# Patient Record
Sex: FEMALE | Race: WHITE | Employment: OTHER | URBAN - METROPOLITAN AREA
[De-identification: names, ages, dates, MRNs, and addresses within clinical notes are randomized per-mention and may not be internally consistent; named-entity substitution may affect disease eponyms.]

---

## 2017-02-20 ENCOUNTER — ONCOLOGY VISIT (OUTPATIENT)
Dept: ONCOLOGY | Facility: CLINIC | Age: 67
End: 2017-02-20
Attending: INTERNAL MEDICINE
Payer: MEDICARE

## 2017-02-20 ENCOUNTER — HOSPITAL ENCOUNTER (OUTPATIENT)
Facility: CLINIC | Age: 67
Setting detail: SPECIMEN
Discharge: HOME | End: 2017-02-20
Attending: INTERNAL MEDICINE | Admitting: INTERNAL MEDICINE
Payer: MEDICARE

## 2017-02-20 DIAGNOSIS — D50.9 IRON DEFICIENCY ANEMIA, UNSPECIFIED IRON DEFICIENCY ANEMIA TYPE: ICD-10-CM

## 2017-02-20 LAB
ALBUMIN SERPL-MCNC: 3.6 G/DL (ref 3.4–5)
ALP SERPL-CCNC: 108 U/L (ref 40–150)
ALT SERPL W P-5'-P-CCNC: 34 U/L (ref 0–50)
ANION GAP SERPL CALCULATED.3IONS-SCNC: 7 MMOL/L (ref 3–14)
AST SERPL W P-5'-P-CCNC: 33 U/L (ref 0–45)
BASOPHILS # BLD AUTO: 0 10E9/L (ref 0–0.2)
BASOPHILS NFR BLD AUTO: 0.1 %
BILIRUB SERPL-MCNC: 0.4 MG/DL (ref 0.2–1.3)
BUN SERPL-MCNC: 59 MG/DL (ref 7–30)
CALCIUM SERPL-MCNC: 9.6 MG/DL (ref 8.5–10.1)
CHLORIDE SERPL-SCNC: 108 MMOL/L (ref 94–109)
CO2 SERPL-SCNC: 21 MMOL/L (ref 20–32)
CREAT SERPL-MCNC: 1.46 MG/DL (ref 0.52–1.04)
DIFFERENTIAL METHOD BLD: ABNORMAL
EOSINOPHIL # BLD AUTO: 0.2 10E9/L (ref 0–0.7)
EOSINOPHIL NFR BLD AUTO: 2.7 %
ERYTHROCYTE [DISTWIDTH] IN BLOOD BY AUTOMATED COUNT: 13.3 % (ref 10–15)
FERRITIN SERPL-MCNC: 258 NG/ML (ref 8–252)
GFR SERPL CREATININE-BSD FRML MDRD: 36 ML/MIN/1.7M2
GLUCOSE SERPL-MCNC: 91 MG/DL (ref 70–99)
HCT VFR BLD AUTO: 34 % (ref 35–47)
HGB BLD-MCNC: 11.5 G/DL (ref 11.7–15.7)
IMM GRANULOCYTES # BLD: 0 10E9/L (ref 0–0.4)
IMM GRANULOCYTES NFR BLD: 0.3 %
IRON SATN MFR SERPL: 20 % (ref 15–46)
IRON SERPL-MCNC: 50 UG/DL (ref 35–180)
LYMPHOCYTES # BLD AUTO: 1.1 10E9/L (ref 0.8–5.3)
LYMPHOCYTES NFR BLD AUTO: 14 %
MCH RBC QN AUTO: 30.1 PG (ref 26.5–33)
MCHC RBC AUTO-ENTMCNC: 33.8 G/DL (ref 31.5–36.5)
MCV RBC AUTO: 89 FL (ref 78–100)
MONOCYTES # BLD AUTO: 0.9 10E9/L (ref 0–1.3)
MONOCYTES NFR BLD AUTO: 11.3 %
NEUTROPHILS # BLD AUTO: 5.6 10E9/L (ref 1.6–8.3)
NEUTROPHILS NFR BLD AUTO: 71.6 %
NRBC # BLD AUTO: 0 10*3/UL
NRBC BLD AUTO-RTO: 0 /100
PLATELET # BLD AUTO: 222 10E9/L (ref 150–450)
POTASSIUM SERPL-SCNC: 5.1 MMOL/L (ref 3.4–5.3)
PROT SERPL-MCNC: 8.2 G/DL (ref 6.8–8.8)
RBC # BLD AUTO: 3.82 10E12/L (ref 3.8–5.2)
SODIUM SERPL-SCNC: 136 MMOL/L (ref 133–144)
TIBC SERPL-MCNC: 246 UG/DL (ref 240–430)
TRANSFERRIN SERPL-MCNC: 190 MG/DL (ref 210–360)
WBC # BLD AUTO: 7.8 10E9/L (ref 4–11)

## 2017-02-20 PROCEDURE — 40000269 ZZH STATISTIC NO CHARGE FACILITY FEE

## 2017-02-20 PROCEDURE — 83540 ASSAY OF IRON: CPT | Performed by: INTERNAL MEDICINE

## 2017-02-20 PROCEDURE — 84466 ASSAY OF TRANSFERRIN: CPT | Performed by: INTERNAL MEDICINE

## 2017-02-20 PROCEDURE — 80053 COMPREHEN METABOLIC PANEL: CPT | Performed by: INTERNAL MEDICINE

## 2017-02-20 PROCEDURE — 82728 ASSAY OF FERRITIN: CPT | Performed by: INTERNAL MEDICINE

## 2017-02-20 PROCEDURE — 83550 IRON BINDING TEST: CPT | Performed by: INTERNAL MEDICINE

## 2017-02-20 PROCEDURE — 85025 COMPLETE CBC W/AUTO DIFF WBC: CPT | Performed by: INTERNAL MEDICINE

## 2017-02-20 PROCEDURE — 36415 COLL VENOUS BLD VENIPUNCTURE: CPT

## 2017-02-20 NOTE — MR AVS SNAPSHOT
After Visit Summary   2/20/2017    Abbey Aguiar    MRN: [id number]           Patient Information     Date Of Birth          1950        Visit Information        Provider Department      2/20/2017 11:00 AM Nurse, [facility]        Today's Diagnoses     Iron deficiency anemia, unspecified iron deficiency anemia type           Follow-ups after your visit        Who to contact     If you have questions or need follow up information about today's clinic visit or your schedule please contact [facility] directly at [phone].  Normal or non-critical lab and imaging results will be communicated to you by ZAOZAOhart, letter or phone within 4 business days after the clinic has received the results. If you do not hear from us within 7 days, please contact the clinic through Minubo or phone. If you have a critical or abnormal lab result, we will notify you by phone as soon as possible.  Submit refill requests through Minubo or call your pharmacy and they will forward the refill request to us. Please allow 3 business days for your refill to be completed.          Additional Information About Your Visit        MyChart Information     Minubo gives you secure access to your electronic health record. If you see a primary care provider, you can also send messages to your care team and make appointments. If you have questions, please call your primary care clinic.  If you do not have a primary care provider, please call [phone] and they will assist you.        Care EveryWhere ID     This is your Care EveryWhere ID. This could be used by other organizations to access your [facility] medical records  [id number]        Your Vitals Were     Last Period                   05/07/1986            Blood Pressure from Last 3 Encounters:   02/22/17 121/76   08/24/16 147/75   04/20/16 142/72    Weight from Last 3 Encounters:   02/22/17 54.8 kg (120 lb 12.8 oz)   08/24/16 52.6 kg  (116 lb)   04/20/16 51.4 kg (113 lb 6.4 oz)              We Performed the Following     CBC with platelets differential     Comprehensive metabolic panel     Ferritin     Iron and iron binding capacity     Transferrin        Primary Care Provider Office Phone # Fax #    Marian Stovall [phone]       [facility] [address]  [place] [address]        Thank you!     Thank you for choosing [facility]  for your care. Our goal is always to provide you with excellent care. Hearing back from our patients is one way we can continue to improve our services. Please take a few minutes to complete the written survey that you may receive in the mail after your visit with us. Thank you!             Your Updated Medication List - Protect others around you: Learn how to safely use, store and throw away your medicines at www.disposemymeds.org.          This list is accurate as of: 2/20/17 11:59 PM.  Always use your most recent med list.                   Brand Name Dispense Instructions for use    amitriptyline 25 MG tablet    ELAVIL     1 TABLET QHS       amoxicillin 500 MG capsule    AMOXIL     Take 500 mg by mouth See Admin Instructions. Take 4 tablets 1 hour prior to dental work       aspirin 81 MG tablet      1 TABLET DAILY       BONIVA 3 MG/3ML   Generic drug:  ibandronate      3 MG INTRAVENOUSLY EVERY 3 MONTHS       desonide 0.05 % cream    DESOWEN     Apply  topically 2 times daily.       DIOVAN PO      Take 80 mg by mouth 2 times daily       ENBREL 50 MG/ML injection   Generic drug:  etanercept      50 MG EVERY WEEK       Ferrous Sulfate 324 (65 FE) MG Tbec      Take 2 tablets by mouth daily.       Fish Oil 1200 MG Caps      2 tabs daily       LORazepam 0.5 MG tablet    ATIVAN     1 tablet PRN       metoprolol 50 MG 24 hr tablet    TOPROL-XL     Take 50 mg by mouth every morning.       NITROSTAT 0.4 MG sublingual tablet   Generic drug:  nitroglycerin      Place 0.4 mg under  the tongue every 5 minutes as needed.       TYLENOL 500 MG tablet   Generic drug:  acetaminophen      Take 1-2 tablets by mouth every 6 hours as needed.       vitamin D 1000 UNITS capsule      Take 1 capsule by mouth daily

## 2017-02-22 ENCOUNTER — ONCOLOGY VISIT (OUTPATIENT)
Dept: ONCOLOGY | Facility: CLINIC | Age: 67
End: 2017-02-22
Attending: INTERNAL MEDICINE
Payer: COMMERCIAL

## 2017-02-22 VITALS
HEART RATE: 71 BPM | RESPIRATION RATE: 16 BRPM | WEIGHT: 120.8 LBS | BODY MASS INDEX: 22.46 KG/M2 | TEMPERATURE: 98.4 F | SYSTOLIC BLOOD PRESSURE: 121 MMHG | OXYGEN SATURATION: 97 % | DIASTOLIC BLOOD PRESSURE: 76 MMHG

## 2017-02-22 DIAGNOSIS — D50.9 ANEMIA, IRON DEFICIENCY: Primary | ICD-10-CM

## 2017-02-22 PROCEDURE — 99213 OFFICE O/P EST LOW 20 MIN: CPT | Performed by: INTERNAL MEDICINE

## 2017-02-22 PROCEDURE — 99211 OFF/OP EST MAY X REQ PHY/QHP: CPT

## 2017-02-22 ASSESSMENT — PAIN SCALES - GENERAL: PAINLEVEL: MODERATE PAIN (4)

## 2017-02-22 NOTE — PROGRESS NOTES
[facility] PHYSICIANS    HEMATOLOGY ONCOLOGY  FOLLOW-UP VISIT NOTE    PATIENT NAME: Meli Aguiar MRN # [id number]    Reason for visit: Anemia of chronic disease   Iron deficiency anemia since last more than 20 years.  Chronic renal failure, Ankylosing spondylitis and chronic rheumatological disease with overlap syndrome, HLA-B27 positivity.   Crohn's disease last colonoscopy 3 years ago- due for one 02/2015. PUD, EGD 10-15 years ago.     SUBJECTIVE   Patient comes in for a follow up today. She has been feeling well. Coming off from steroids.    REVIEW OF SYSTEMS   As above in the HPI, o/w a complete ROS was negative.    Past medical, social histories reviewed.    Meds- Reviewed.   PHYSICAL EXAM   /76 (BP Location: Right arm, Patient Position: Chair, Cuff Size: Adult Regular)  Pulse 71  Temp 98.4  F (36.9  C) (Oral)  Resp 16  Wt 54.8 kg (120 lb 12.8 oz)  LMP 05/07/1986  SpO2 97%  BMI 22.46 kg/m2  GEN: NAD  NEURO: alert    DATA:  Recent Labs   Lab Test  02/20/17   1040  08/22/16   1220   NA  136  135   POTASSIUM  5.1  5.1   CHLORIDE  108  108   CO2  21  20   ANIONGAP  7  7   BUN  59*  37*   CR  1.46*  1.29*   GLC  91  85   ANABEL  9.6  9.8     Recent Labs   Lab Test  02/20/17   1040  08/22/16   1220  04/20/16   1043   WBC  7.8  12.2*  11.0   HGB  11.5*  12.3  11.2*   PLT  222  250  240   MCV  89  90  88   NEUTROPHIL  71.6  77.8  77.5     Recent Labs   Lab Test  02/20/17   1040  08/22/16   1220  04/20/16   1043   09/10/14   1005   03/13/14   0927  12/16/13   1045   BILITOTAL  0.4  0.3  0.2   < >  0.3   < >  0.4  0.4   ALKPHOS  108  119  97   < >  88   < >  82  74   ALT  34  46  44   < >  32   < >  34  36   AST  33  31  25   < >  31   < >  35  35   ALBUMIN  3.6  3.6  3.5   < >  3.5   < >  4.5  4.8   LDH   --    --    --    --   110   --   230*  277*    < > = values in this interval not displayed.     Results for ASHER MELI CODY (MRN [id number]) as of 2/22/2017 10:25   Ref. Range 8/22/2016 12:20  2/20/2017 10:40   Ferritin Latest Ref Range: 8 - 252 ng/mL 159 258 (H)   Iron Latest Ref Range: 35 - 180 ug/dL 52 50   Iron Binding Cap Latest Ref Range: 240 - 430 ug/dL 254 246   Iron Saturation Index Latest Ref Range: 15 - 46 % 20 20   Transferrin Latest Ref Range: 210 - 360 mg/dL 189 (L) 190 (L)     ECOG PS: 1     ASSESSMENT   63-year-old woman with a history of anemia of chronic disease and iron deficiency anemia in addition to chronic rheumatological illness. She had intravenous iron infusion and she is not able to tolerate supplemental oral iron due to her multiple medical problems and difficulty with taking supplemental oral iron.  This results abdominal discomfort and intolerance.   - Labs were reviewed with the patient- fairly stable.  - She has needed IV iron replacement for a long time. I think she can continue oral iron for now and follow up with primary care physician with periodic blood counts and iron studies. I will be happy to see her again is anemia or iron deficiency becomes an issue in future.   PLAN   1- Follow up with Dr. Wilman Thakur MD  2/22/2017     cc: Marshall Stovall

## 2017-02-22 NOTE — NURSING NOTE
DISCHARGE PLAN:  Next appointments: See patient instruction section  Departure Mode: Ambulatory  Accompanied by: Self  5 minutes for nursing discharge (face to face time)   Radha Holly RN    1- Follow up with Dr. Stovall--Lizette RAMOS.--Pt verbalized understanding

## 2017-02-22 NOTE — PROGRESS NOTES
"Abbey Aguiar is a 66 year old female who presents for:  Chief Complaint   Patient presents with     Oncology Clinic Visit     Ret anemia        Initial Vitals:  /76 (BP Location: Right arm, Patient Position: Chair, Cuff Size: Adult Regular)  Pulse 71  Temp 98.4  F (36.9  C) (Oral)  Resp 16  Wt 54.8 kg (120 lb 12.8 oz)  LMP 05/07/1986  SpO2 97%  BMI 22.46 kg/m2 Estimated body mass index is 22.46 kg/(m^2) as calculated from the following:    Height as of 2/12/13: 1.562 m (5' 1.5\").    Weight as of this encounter: 54.8 kg (120 lb 12.8 oz).. Body surface area is 1.54 meters squared. BP completed using cuff size: regular  Moderate Pain (4) Patient's last menstrual period was 05/07/1986. Allergies and medications reviewed.     Medications: Medication refills not needed today.  Pharmacy name entered into [facility]:    [facility] [id number] [place] [facility] [place] [facility] [address] [facility] [place] [address]  [id number] [place], [place] - [address] [facility] AT [facility] [address]    Comments: None    6 minutes for nursing intake (face to face time)   Divya Gonzalez CMA          "

## 2017-02-22 NOTE — MR AVS SNAPSHOT
After Visit Summary   2/22/2017    Abbey Aguiar    MRN: [id number]           Patient Information     Date Of Birth          1950        Visit Information        Provider Department      2/22/2017 11:00 AM Edilson Thakur MD [facility]        Care Instructions    1- Follow up with Dr. Stovall--Lizette PCP.        Follow-ups after your visit        Who to contact     If you have questions or need follow up information about today's clinic visit or your schedule please contact [facility] directly at [phone].  Normal or non-critical lab and imaging results will be communicated to you by Benaissancehart, letter or phone within 4 business days after the clinic has received the results. If you do not hear from us within 7 days, please contact the clinic through Wave Systemst or phone. If you have a critical or abnormal lab result, we will notify you by phone as soon as possible.  Submit refill requests through Wonolo or call your pharmacy and they will forward the refill request to us. Please allow 3 business days for your refill to be completed.          Additional Information About Your Visit        MyChart Information     Wonolo gives you secure access to your electronic health record. If you see a primary care provider, you can also send messages to your care team and make appointments. If you have questions, please call your primary care clinic.  If you do not have a primary care provider, please call [phone] and they will assist you.        Care EveryWhere ID     This is your Care EveryWhere ID. This could be used by other organizations to access your [place] medical records  [id number]        Your Vitals Were     Pulse Temperature Respirations Last Period Pulse Oximetry BMI (Body Mass Index)    71 98.4  F (36.9  C) (Oral) 16 05/07/1986 97% 22.46 kg/m2       Blood Pressure from Last 3 Encounters:   02/22/17 121/76   08/24/16 147/75   04/20/16 142/72    Weight from Last 3  Encounters:   02/22/17 54.8 kg (120 lb 12.8 oz)   08/24/16 52.6 kg (116 lb)   04/20/16 51.4 kg (113 lb 6.4 oz)              Today, you had the following     No orders found for display       Primary Care Provider Office Phone # Fax #    Marian Stovall [phone]       [place] [address]  [place] [address]        Thank you!     Thank you for choosing [facility]  for your care. Our goal is always to provide you with excellent care. Hearing back from our patients is one way we can continue to improve our services. Please take a few minutes to complete the written survey that you may receive in the mail after your visit with us. Thank you!             Your Updated Medication List - Protect others around you: Learn how to safely use, store and throw away your medicines at www.disposemymeds.org.          This list is accurate as of: 2/22/17 11:06 AM.  Always use your most recent med list.                   Brand Name Dispense Instructions for use    amitriptyline 25 MG tablet    ELAVIL     1 TABLET QHS       amoxicillin 500 MG capsule    AMOXIL     Take 500 mg by mouth See Admin Instructions. Take 4 tablets 1 hour prior to dental work       aspirin 81 MG tablet      1 TABLET DAILY       BONIVA 3 MG/3ML   Generic drug:  ibandronate      3 MG INTRAVENOUSLY EVERY 3 MONTHS       desonide 0.05 % cream    DESOWEN     Apply  topically 2 times daily.       DIOVAN PO      Take 80 mg by mouth 2 times daily       ENBREL 50 MG/ML injection   Generic drug:  etanercept      50 MG EVERY WEEK       Ferrous Sulfate 324 (65 FE) MG Tbec      Take 2 tablets by mouth daily.       Fish Oil 1200 MG Caps      2 tabs daily       LORazepam 0.5 MG tablet    ATIVAN     1 tablet PRN       metoprolol 50 MG 24 hr tablet    TOPROL-XL     Take 50 mg by mouth every morning.       NITROSTAT 0.4 MG sublingual tablet   Generic drug:  nitroglycerin      Place 0.4 mg under the tongue every 5 minutes as  needed.       ROSUVASTATIN CALCIUM PO      Take 5 mg by mouth every other day       TYLENOL 500 MG tablet   Generic drug:  acetaminophen      Take 1-2 tablets by mouth every 6 hours as needed.       vitamin D 1000 UNITS capsule      Take 1 capsule by mouth daily

## 2017-03-15 ENCOUNTER — TRANSFERRED RECORDS (OUTPATIENT)
Dept: HEALTH INFORMATION MANAGEMENT | Facility: CLINIC | Age: 67
End: 2017-03-15

## 2017-04-17 NOTE — PROGRESS NOTES
Patient here for labs  Labs drawn:see orders      Denies concerns or issues that need to be addressed prior to appt with MD Divya Gonzalez

## 2017-08-16 ENCOUNTER — TRANSFERRED RECORDS (OUTPATIENT)
Dept: HEALTH INFORMATION MANAGEMENT | Facility: CLINIC | Age: 67
End: 2017-08-16

## 2018-02-14 ENCOUNTER — TRANSFERRED RECORDS (OUTPATIENT)
Dept: HEALTH INFORMATION MANAGEMENT | Facility: CLINIC | Age: 68
End: 2018-02-14

## 2018-02-14 LAB
ALT SERPL-CCNC: 69 IU/L (ref 5–35)
AST SERPL-CCNC: 56 U/L (ref 5–34)
CREAT SERPL-MCNC: 1.39 MG/DL (ref 0.5–1.3)
GFR SERPL CREATININE-BSD FRML MDRD: 40.1 ML/MIN/1.73M2

## 2018-05-11 ENCOUNTER — TRANSFERRED RECORDS (OUTPATIENT)
Dept: HEALTH INFORMATION MANAGEMENT | Facility: CLINIC | Age: 68
End: 2018-05-11

## 2018-08-15 ENCOUNTER — TRANSFERRED RECORDS (OUTPATIENT)
Dept: HEALTH INFORMATION MANAGEMENT | Facility: CLINIC | Age: 68
End: 2018-08-15

## 2018-12-05 ENCOUNTER — TRANSFERRED RECORDS (OUTPATIENT)
Dept: HEALTH INFORMATION MANAGEMENT | Facility: CLINIC | Age: 68
End: 2018-12-05

## 2019-02-15 ENCOUNTER — TRANSFERRED RECORDS (OUTPATIENT)
Dept: HEALTH INFORMATION MANAGEMENT | Facility: CLINIC | Age: 69
End: 2019-02-15

## 2019-07-11 ENCOUNTER — TRANSFERRED RECORDS (OUTPATIENT)
Dept: HEALTH INFORMATION MANAGEMENT | Facility: CLINIC | Age: 69
End: 2019-07-11

## 2019-08-26 ENCOUNTER — TRANSFERRED RECORDS (OUTPATIENT)
Dept: HEALTH INFORMATION MANAGEMENT | Facility: CLINIC | Age: 69
End: 2019-08-26

## 2019-10-02 ENCOUNTER — HEALTH MAINTENANCE LETTER (OUTPATIENT)
Age: 69
End: 2019-10-02

## 2019-11-13 ENCOUNTER — TRANSFERRED RECORDS (OUTPATIENT)
Dept: HEALTH INFORMATION MANAGEMENT | Facility: CLINIC | Age: 69
End: 2019-11-13

## 2019-12-16 ENCOUNTER — HEALTH MAINTENANCE LETTER (OUTPATIENT)
Age: 69
End: 2019-12-16

## 2020-05-26 DIAGNOSIS — Z11.59 ENCOUNTER FOR SCREENING FOR OTHER VIRAL DISEASES: Primary | ICD-10-CM

## 2020-06-06 DIAGNOSIS — Z11.59 ENCOUNTER FOR SCREENING FOR OTHER VIRAL DISEASES: ICD-10-CM

## 2020-06-06 PROCEDURE — 99207 ZZC NO BILLABLE SERVICE THIS VISIT: CPT

## 2020-06-08 LAB
SARS-COV-2 PCR COMMENT: NORMAL
SARS-COV-2 RNA SPEC QL NAA+PROBE: NEGATIVE
SARS-COV-2 RNA SPEC QL NAA+PROBE: NORMAL
SPECIMEN SOURCE: NORMAL
SPECIMEN SOURCE: NORMAL

## 2020-06-09 ENCOUNTER — ANESTHESIA EVENT (OUTPATIENT)
Dept: GASTROENTEROLOGY | Facility: CLINIC | Age: 70
End: 2020-06-09
Payer: COMMERCIAL

## 2020-06-09 ENCOUNTER — HOSPITAL ENCOUNTER (OUTPATIENT)
Facility: CLINIC | Age: 70
Discharge: HOME | End: 2020-06-09
Attending: INTERNAL MEDICINE | Admitting: INTERNAL MEDICINE
Payer: COMMERCIAL

## 2020-06-09 ENCOUNTER — ANESTHESIA (OUTPATIENT)
Dept: GASTROENTEROLOGY | Facility: CLINIC | Age: 70
End: 2020-06-09
Payer: COMMERCIAL

## 2020-06-09 VITALS
HEART RATE: 68 BPM | OXYGEN SATURATION: 96 % | HEIGHT: 59 IN | SYSTOLIC BLOOD PRESSURE: 147 MMHG | BODY MASS INDEX: 23.1 KG/M2 | DIASTOLIC BLOOD PRESSURE: 86 MMHG | WEIGHT: 114.6 LBS | RESPIRATION RATE: 19 BRPM

## 2020-06-09 LAB — COLONOSCOPY: NORMAL

## 2020-06-09 PROCEDURE — 37000009 ZZH ANESTHESIA TECHNICAL FEE, EACH ADDTL 15 MIN: Performed by: INTERNAL MEDICINE

## 2020-06-09 PROCEDURE — 40000010 ZZH STATISTIC ANES STAT CODE-CRNA PER MINUTE: Performed by: INTERNAL MEDICINE

## 2020-06-09 PROCEDURE — 25800030 ZZH RX IP 258 OP 636: Performed by: NURSE ANESTHETIST, CERTIFIED REGISTERED

## 2020-06-09 PROCEDURE — 45380 COLONOSCOPY AND BIOPSY: CPT | Mod: PT | Performed by: INTERNAL MEDICINE

## 2020-06-09 PROCEDURE — 25000128 H RX IP 250 OP 636: Performed by: NURSE ANESTHETIST, CERTIFIED REGISTERED

## 2020-06-09 PROCEDURE — 88305 TISSUE EXAM BY PATHOLOGIST: CPT | Mod: 26,59 | Performed by: INTERNAL MEDICINE

## 2020-06-09 PROCEDURE — 25000125 ZZHC RX 250: Performed by: NURSE ANESTHETIST, CERTIFIED REGISTERED

## 2020-06-09 PROCEDURE — 37000008 ZZH ANESTHESIA TECHNICAL FEE, 1ST 30 MIN: Performed by: INTERNAL MEDICINE

## 2020-06-09 PROCEDURE — 88305 TISSUE EXAM BY PATHOLOGIST: CPT | Performed by: INTERNAL MEDICINE

## 2020-06-09 RX ORDER — NALOXONE HYDROCHLORIDE 0.4 MG/ML
.1-.4 INJECTION, SOLUTION INTRAMUSCULAR; INTRAVENOUS; SUBCUTANEOUS
Status: DISCONTINUED | OUTPATIENT
Start: 2020-06-09 | End: 2020-06-09 | Stop reason: HOSPADM

## 2020-06-09 RX ORDER — FLUMAZENIL 0.1 MG/ML
0.2 INJECTION, SOLUTION INTRAVENOUS
Status: DISCONTINUED | OUTPATIENT
Start: 2020-06-09 | End: 2020-06-09 | Stop reason: HOSPADM

## 2020-06-09 RX ORDER — SODIUM CHLORIDE, SODIUM LACTATE, POTASSIUM CHLORIDE, CALCIUM CHLORIDE 600; 310; 30; 20 MG/100ML; MG/100ML; MG/100ML; MG/100ML
INJECTION, SOLUTION INTRAVENOUS CONTINUOUS PRN
Status: DISCONTINUED | OUTPATIENT
Start: 2020-06-09 | End: 2020-06-09

## 2020-06-09 RX ORDER — PREDNISONE 1 MG/1
TABLET ORAL DAILY
COMMUNITY

## 2020-06-09 RX ORDER — PROPOFOL 10 MG/ML
INJECTION, EMULSION INTRAVENOUS PRN
Status: DISCONTINUED | OUTPATIENT
Start: 2020-06-09 | End: 2020-06-09

## 2020-06-09 RX ORDER — PROPOFOL 10 MG/ML
INJECTION, EMULSION INTRAVENOUS CONTINUOUS PRN
Status: DISCONTINUED | OUTPATIENT
Start: 2020-06-09 | End: 2020-06-09

## 2020-06-09 RX ORDER — ONDANSETRON 2 MG/ML
INJECTION INTRAMUSCULAR; INTRAVENOUS PRN
Status: DISCONTINUED | OUTPATIENT
Start: 2020-06-09 | End: 2020-06-09

## 2020-06-09 RX ORDER — LIDOCAINE HYDROCHLORIDE 20 MG/ML
INJECTION, SOLUTION INFILTRATION; PERINEURAL PRN
Status: DISCONTINUED | OUTPATIENT
Start: 2020-06-09 | End: 2020-06-09

## 2020-06-09 RX ORDER — LIDOCAINE 40 MG/G
CREAM TOPICAL
Status: DISCONTINUED | OUTPATIENT
Start: 2020-06-09 | End: 2020-06-09 | Stop reason: HOSPADM

## 2020-06-09 RX ADMIN — ONDANSETRON 4 MG: 2 INJECTION INTRAMUSCULAR; INTRAVENOUS at 09:47

## 2020-06-09 RX ADMIN — PROPOFOL 20 MG: 10 INJECTION, EMULSION INTRAVENOUS at 09:58

## 2020-06-09 RX ADMIN — SODIUM CHLORIDE, POTASSIUM CHLORIDE, SODIUM LACTATE AND CALCIUM CHLORIDE: 600; 310; 30; 20 INJECTION, SOLUTION INTRAVENOUS at 09:44

## 2020-06-09 RX ADMIN — LIDOCAINE HYDROCHLORIDE 40 MG: 20 INJECTION, SOLUTION INFILTRATION; PERINEURAL at 09:44

## 2020-06-09 RX ADMIN — PROPOFOL 10 MG: 10 INJECTION, EMULSION INTRAVENOUS at 09:52

## 2020-06-09 RX ADMIN — PROPOFOL 20 MG: 10 INJECTION, EMULSION INTRAVENOUS at 09:48

## 2020-06-09 RX ADMIN — PROPOFOL 20 MG: 10 INJECTION, EMULSION INTRAVENOUS at 10:02

## 2020-06-09 RX ADMIN — PROPOFOL 50 MCG/KG/MIN: 10 INJECTION, EMULSION INTRAVENOUS at 09:44

## 2020-06-09 ASSESSMENT — MIFFLIN-ST. JEOR: SCORE: 945.45

## 2020-06-09 ASSESSMENT — LIFESTYLE VARIABLES: TOBACCO_USE: 0

## 2020-06-09 ASSESSMENT — ENCOUNTER SYMPTOMS: SEIZURES: 0

## 2020-06-09 NOTE — ANESTHESIA CARE TRANSFER NOTE
Patient: Abbey Aguiar    Procedure(s):  COLONOSCOPY    Diagnosis: Special screening for malignant neoplasms, colon [Z12.11]  Diagnosis Additional Information: No value filed.    Anesthesia Type:   MAC     Note:  Airway :Nasal Cannula  Patient transferred to:Phase II  Handoff Report: Identifed the Patient, Identified the Reponsible Provider, Reviewed the pertinent medical history, Discussed the surgical course, Reviewed Intra-OP anesthesia mangement and issues during anesthesia, Set expectations for post-procedure period and Allowed opportunity for questions and acknowledgement of understanding      Vitals: (Last set prior to Anesthesia Care Transfer)    CRNA VITALS  6/9/2020 0933 - 6/9/2020 1003      6/9/2020             Pulse:  67    Ht Rate:  66    SpO2:  100 %    Resp Rate (set):  10                Electronically Signed By: RICHARD Reid CRNA  June 9, 2020  10:03 AM

## 2020-06-09 NOTE — ANESTHESIA CARE TRANSFER NOTE
Patient: Abbey Aguiar    Procedure(s):  COLONOSCOPY    Diagnosis: Special screening for malignant neoplasms, colon [Z12.11]  Diagnosis Additional Information: No value filed.    Anesthesia Type:   MAC     Note:  Airway :Face Mask  Patient transferred to:Phase II  Handoff Report: Identifed the Patient, Identified the Reponsible Provider, Reviewed the pertinent medical history, Discussed the surgical course, Reviewed Intra-OP anesthesia mangement and issues during anesthesia, Set expectations for post-procedure period and Allowed opportunity for questions and acknowledgement of understanding      Vitals: (Last set prior to Anesthesia Care Transfer)    CRNA VITALS  6/9/2020 0955 - 6/9/2020 1029      6/9/2020             Pulse:  61    SpO2:  100 %    Resp Rate (set):  10                Electronically Signed By: RICHARD Reid CRNA  June 9, 2020  10:29 AM

## 2020-06-09 NOTE — ANESTHESIA POSTPROCEDURE EVALUATION
Patient: Abbey Aguiar    Procedure(s):  COLONOSCOPY, WITH POLYPECTOMY AND BIOPSY    Diagnosis:Special screening for malignant neoplasms, colon [Z12.11]  Diagnosis Additional Information: No value filed.    Anesthesia Type:  MAC    Note:  Anesthesia Post Evaluation    Patient location during evaluation: PACU  Patient participation: Able to fully participate in evaluation  Level of consciousness: awake and alert  Pain management: satisfactory to patient  Airway patency: patent  Cardiovascular status: hemodynamically stable  Respiratory status: acceptable and unassisted  Hydration status: balanced  PONV: none     Anesthetic complications: None          Last vitals:  Vitals:    06/09/20 1027 06/09/20 1030 06/09/20 1040   BP: (!) 144/74 (!) 147/73 (!) 147/86   Pulse: 63 64 68   Resp:  16 19   SpO2:  100% 96%         Electronically Signed By: Nael Sherman MD  June 9, 2020  1:41 PM

## 2020-06-10 LAB — COPATH REPORT: NORMAL

## 2021-01-15 ENCOUNTER — HEALTH MAINTENANCE LETTER (OUTPATIENT)
Age: 71
End: 2021-01-15

## 2021-09-05 ENCOUNTER — HEALTH MAINTENANCE LETTER (OUTPATIENT)
Age: 71
End: 2021-09-05

## 2021-10-31 ENCOUNTER — HEALTH MAINTENANCE LETTER (OUTPATIENT)
Age: 71
End: 2021-10-31

## 2022-02-19 ENCOUNTER — HEALTH MAINTENANCE LETTER (OUTPATIENT)
Age: 72
End: 2022-02-19

## 2022-07-19 ENCOUNTER — ANESTHESIA (OUTPATIENT)
Dept: GASTROENTEROLOGY | Facility: CLINIC | Age: 72
End: 2022-07-19
Payer: COMMERCIAL

## 2022-07-19 ENCOUNTER — ANESTHESIA EVENT (OUTPATIENT)
Dept: GASTROENTEROLOGY | Facility: CLINIC | Age: 72
End: 2022-07-19
Payer: COMMERCIAL

## 2022-07-19 ENCOUNTER — HOSPITAL ENCOUNTER (OUTPATIENT)
Facility: CLINIC | Age: 72
Discharge: HOME | End: 2022-07-19
Attending: INTERNAL MEDICINE | Admitting: INTERNAL MEDICINE
Payer: COMMERCIAL

## 2022-07-19 VITALS
HEIGHT: 59 IN | SYSTOLIC BLOOD PRESSURE: 140 MMHG | WEIGHT: 118 LBS | DIASTOLIC BLOOD PRESSURE: 82 MMHG | RESPIRATION RATE: 16 BRPM | BODY MASS INDEX: 23.79 KG/M2 | OXYGEN SATURATION: 95 % | HEART RATE: 68 BPM

## 2022-07-19 LAB — COLONOSCOPY: NORMAL

## 2022-07-19 PROCEDURE — 88305 TISSUE EXAM BY PATHOLOGIST: CPT | Mod: TC | Performed by: INTERNAL MEDICINE

## 2022-07-19 PROCEDURE — 250N000011 HC RX IP 250 OP 636: Performed by: ANESTHESIOLOGY

## 2022-07-19 PROCEDURE — 999N000010 HC STATISTIC ANES STAT CODE-CRNA PER MINUTE: Performed by: INTERNAL MEDICINE

## 2022-07-19 PROCEDURE — 258N000003 HC RX IP 258 OP 636: Performed by: ANESTHESIOLOGY

## 2022-07-19 PROCEDURE — 370N000017 HC ANESTHESIA TECHNICAL FEE, PER MIN: Performed by: INTERNAL MEDICINE

## 2022-07-19 PROCEDURE — 45380 COLONOSCOPY AND BIOPSY: CPT | Mod: PT | Performed by: INTERNAL MEDICINE

## 2022-07-19 PROCEDURE — 250N000009 HC RX 250: Performed by: ANESTHESIOLOGY

## 2022-07-19 RX ORDER — ALLOPURINOL 100 MG/1
100 TABLET ORAL DAILY
COMMUNITY

## 2022-07-19 RX ORDER — NALOXONE HYDROCHLORIDE 0.4 MG/ML
0.4 INJECTION, SOLUTION INTRAMUSCULAR; INTRAVENOUS; SUBCUTANEOUS
Status: CANCELLED | OUTPATIENT
Start: 2022-07-19

## 2022-07-19 RX ORDER — LIDOCAINE HYDROCHLORIDE 20 MG/ML
INJECTION, SOLUTION INFILTRATION; PERINEURAL PRN
Status: DISCONTINUED | OUTPATIENT
Start: 2022-07-19 | End: 2022-07-19

## 2022-07-19 RX ORDER — CARVEDILOL 25 MG/1
25 TABLET ORAL 2 TIMES DAILY WITH MEALS
COMMUNITY

## 2022-07-19 RX ORDER — FLUMAZENIL 0.1 MG/ML
0.2 INJECTION, SOLUTION INTRAVENOUS
Status: CANCELLED | OUTPATIENT
Start: 2022-07-19 | End: 2022-07-19

## 2022-07-19 RX ORDER — DEXMEDETOMIDINE HYDROCHLORIDE 4 UG/ML
INJECTION, SOLUTION INTRAVENOUS PRN
Status: DISCONTINUED | OUTPATIENT
Start: 2022-07-19 | End: 2022-07-19

## 2022-07-19 RX ORDER — ONDANSETRON 2 MG/ML
INJECTION INTRAMUSCULAR; INTRAVENOUS PRN
Status: DISCONTINUED | OUTPATIENT
Start: 2022-07-19 | End: 2022-07-19

## 2022-07-19 RX ORDER — PROPOFOL 10 MG/ML
INJECTION, EMULSION INTRAVENOUS CONTINUOUS PRN
Status: DISCONTINUED | OUTPATIENT
Start: 2022-07-19 | End: 2022-07-19

## 2022-07-19 RX ORDER — PROCHLORPERAZINE MALEATE 5 MG
5 TABLET ORAL EVERY 6 HOURS PRN
Status: CANCELLED | OUTPATIENT
Start: 2022-07-19

## 2022-07-19 RX ORDER — TORSEMIDE 10 MG/1
20 TABLET ORAL DAILY
COMMUNITY

## 2022-07-19 RX ORDER — ONDANSETRON 4 MG/1
4 TABLET, ORALLY DISINTEGRATING ORAL EVERY 6 HOURS PRN
Status: CANCELLED | OUTPATIENT
Start: 2022-07-19

## 2022-07-19 RX ORDER — NALOXONE HYDROCHLORIDE 0.4 MG/ML
0.2 INJECTION, SOLUTION INTRAMUSCULAR; INTRAVENOUS; SUBCUTANEOUS
Status: CANCELLED | OUTPATIENT
Start: 2022-07-19

## 2022-07-19 RX ORDER — ONDANSETRON 2 MG/ML
4 INJECTION INTRAMUSCULAR; INTRAVENOUS EVERY 6 HOURS PRN
Status: CANCELLED | OUTPATIENT
Start: 2022-07-19

## 2022-07-19 RX ORDER — PROPOFOL 10 MG/ML
INJECTION, EMULSION INTRAVENOUS PRN
Status: DISCONTINUED | OUTPATIENT
Start: 2022-07-19 | End: 2022-07-19

## 2022-07-19 RX ORDER — ESTRADIOL 0.1 MG/G
2 CREAM VAGINAL
COMMUNITY

## 2022-07-19 RX ORDER — ONDANSETRON 2 MG/ML
4 INJECTION INTRAMUSCULAR; INTRAVENOUS
Status: DISCONTINUED | OUTPATIENT
Start: 2022-07-19 | End: 2022-07-19 | Stop reason: HOSPADM

## 2022-07-19 RX ORDER — SODIUM CHLORIDE, SODIUM LACTATE, POTASSIUM CHLORIDE, CALCIUM CHLORIDE 600; 310; 30; 20 MG/100ML; MG/100ML; MG/100ML; MG/100ML
INJECTION, SOLUTION INTRAVENOUS CONTINUOUS PRN
Status: DISCONTINUED | OUTPATIENT
Start: 2022-07-19 | End: 2022-07-19

## 2022-07-19 RX ORDER — LIDOCAINE 40 MG/G
CREAM TOPICAL
Status: DISCONTINUED | OUTPATIENT
Start: 2022-07-19 | End: 2022-07-19 | Stop reason: HOSPADM

## 2022-07-19 RX ADMIN — PROPOFOL 10 MG: 10 INJECTION, EMULSION INTRAVENOUS at 08:55

## 2022-07-19 RX ADMIN — PROPOFOL 10 MG: 10 INJECTION, EMULSION INTRAVENOUS at 09:07

## 2022-07-19 RX ADMIN — PROPOFOL 20 MG: 10 INJECTION, EMULSION INTRAVENOUS at 08:54

## 2022-07-19 RX ADMIN — ONDANSETRON 4 MG: 2 INJECTION INTRAMUSCULAR; INTRAVENOUS at 08:53

## 2022-07-19 RX ADMIN — PROPOFOL 10 MG: 10 INJECTION, EMULSION INTRAVENOUS at 09:11

## 2022-07-19 RX ADMIN — LIDOCAINE HYDROCHLORIDE 100 MG: 20 INJECTION, SOLUTION INFILTRATION; PERINEURAL at 08:48

## 2022-07-19 RX ADMIN — DEXMEDETOMIDINE 8 MCG: 100 INJECTION, SOLUTION, CONCENTRATE INTRAVENOUS at 08:49

## 2022-07-19 RX ADMIN — PROPOFOL 30 MG: 10 INJECTION, EMULSION INTRAVENOUS at 08:49

## 2022-07-19 RX ADMIN — PROPOFOL 125 MCG/KG/MIN: 10 INJECTION, EMULSION INTRAVENOUS at 08:47

## 2022-07-19 RX ADMIN — PROPOFOL 20 MG: 10 INJECTION, EMULSION INTRAVENOUS at 09:03

## 2022-07-19 RX ADMIN — SODIUM CHLORIDE, POTASSIUM CHLORIDE, SODIUM LACTATE AND CALCIUM CHLORIDE: 600; 310; 30; 20 INJECTION, SOLUTION INTRAVENOUS at 08:47

## 2022-07-19 RX ADMIN — PROPOFOL 20 MG: 10 INJECTION, EMULSION INTRAVENOUS at 08:57

## 2022-07-19 RX ADMIN — PROPOFOL 20 MG: 10 INJECTION, EMULSION INTRAVENOUS at 08:58

## 2022-07-19 ASSESSMENT — LIFESTYLE VARIABLES: TOBACCO_USE: 1

## 2022-07-19 NOTE — ANESTHESIA POSTPROCEDURE EVALUATION
Patient: Abbey Aguiar    Procedure: Procedure(s):  COLONOSCOPY, WITH POLYPECTOMY AND BIOPSY       Anesthesia Type:  MAC    Note:  Disposition: Outpatient   Postop Pain Control: Uneventful            Sign Out: Well controlled pain   PONV: No   Neuro/Psych: Uneventful            Sign Out: Acceptable/Baseline neuro status   Airway/Respiratory: Uneventful            Sign Out: Acceptable/Baseline resp. status   CV/Hemodynamics: Uneventful            Sign Out: Acceptable CV status   Other NRE: NONE   DID A NON-ROUTINE EVENT OCCUR? No           Last vitals:  Vitals Value Taken Time   /82 07/19/22 0950   Temp     Pulse 68 07/19/22 0950   Resp 16 07/19/22 0950   SpO2 97 % 07/19/22 0952   Vitals shown include unvalidated device data.    Electronically Signed By: Rusty Kohli MD  July 19, 2022  2:02 PM

## 2022-07-19 NOTE — H&P
PRE-PROCEDURE H&P    CHIEF COMPLAINT / REASON FOR PROCEDURE:  History of Crohn's, colonoscopy    PERTINENT HISTORY :    Past Medical History:   Diagnosis Date     Anemia      Ankylosing spondylitis (H)      Arthritis     Ankylosing Spondylitis     Blood transfusion     No Reactions Noted     Coronary artery disease      Crohn's disease (H)      Fructose disorder     malabsorption     Gout      Hypertension      Kidney stones     history of multiple kidney stones     Osteoporosis       Past Surgical History:   Procedure Laterality Date     ANESTH, MYELOGRAPHY, DISCOGRAPHY, VERTEBROPLASTY      multiple fractures     BIOPSY  Mid 's    Right Breast Biopsy - Negative     CARDIAC SURGERY  2011    Heart Stent Placed     COLONOSCOPY       COLONOSCOPY  2013     COLONOSCOPY N/A 2020    Procedure: COLONOSCOPY, WITH POLYPECTOMY AND BIOPSY;  Surgeon: Yara Ulloa MD;  Location:  GI     GENERAL SURGERY                           DATE:  04/10/2012      left shoulder suregery at [facility] then had reverse shoulder surgery  foot surgery for arthritis (), R hand surgery (), kidney stones ()      GENITOURINARY SURGERY      History of Kidney Stones/Removed     HC MUSC/TENDON REPAIR EACH; ARM/ELBOW  early 80's     HYSTERECTOMY, JESSA  1986    right ovary intact     ORTHOPEDIC SURGERY Right 2020    hip repair/surgery     PV RENAL ARTERY STENTING BIILATERAL           Bleeding tendencies:  No    Relevant Family History:  NONE     Relevant Social History:  NONE      A relevant review of systems was performed and was negative    Current symptoms include: none    ALLERGIES/SENSITIVITIES:   Allergies   Allergen Reactions     Tagamet      agranulocytosis     Tetracycline Hives       CURRENT MEDICATIONS:   Prior to Admission Medications   Prescriptions Last Dose Informant Patient Reported? Taking?   AMITRIPTYLINE HCL 25 MG PO TABS 2022 at Unknown time  Yes Yes   Si TABLET QHS    ASPIRIN 81 MG PO TABS 2022 at Unknown time  Yes Yes   Si TABLET DAILY   LORAZEPAM 0.5 MG PO TABS Unknown at Unknown time  Yes Yes   Si tablet PRN   acetaminophen (TYLENOL) 500 MG tablet 2022 at 0300  Yes Yes   Sig: Take 1-2 tablets by mouth every 6 hours as needed.   allopurinol (ZYLOPRIM) 100 MG tablet 2022 at Unknown time  Yes Yes   Sig: Take 100 mg by mouth daily   amoxicillin (AMOXIL) 500 MG capsule   Yes No   Sig: Take 500 mg by mouth See Admin Instructions. Take 4 tablets 1 hour prior to dental work   carvedilol (COREG) 25 MG tablet 2022 at 0300estr  Yes Yes   Sig: Take 25 mg by mouth 2 times daily (with meals)   denosumab (PROLIA) 60 MG/ML SOSY injection More than a month at Unknown time  Yes Yes   Sig: Inject 60 mg Subcutaneous every 6 months   desonide (DESOWEN) 0.05 % cream 2022 at Unknown time  Yes Yes   Sig: Apply topically 2 times daily   estradiol (ESTRACE) 0.1 MG/GM vaginal cream Past Week at Unknown time  Yes Yes   Sig: Place 2 g vaginally twice a week   nitroGLYCERIN (NITROSTAT) 0.4 MG SL tablet   Yes No   Sig: Place 0.4 mg under the tongue every 5 minutes as needed.   predniSONE (DELTASONE) 1 MG tablet 2022 at 0300  Yes Yes   Sig: Take by mouth daily   secukinumab (COSENTYX) 150 MG/ML Sensoready pen Past Week at Unknown time  Yes Yes   Sig: Inject 150 mg Subcutaneous   torsemide (DEMADEX) 10 MG tablet 2022 at Unknown time  Yes Yes   Sig: Take 20 mg by mouth daily      Facility-Administered Medications: None        PRE-SEDATION ASSESSMENT:    Lung Exam:  normal  Airway Exam: abnormal limited neck mobility  Previous reaction to anesthesia/sedation:   No  Sedation plan based on assessment: MAC  ASA Classification:  3 - Severe systemic disease, but not incapacitating    Comments: risks of procedure explained    IMPRESSION:  History of Crohn's    PLAN:  Colonoscopy with bx     Britt Antonio MD, MD  [facility]  Office: [phone]

## 2022-07-19 NOTE — ANESTHESIA CARE TRANSFER NOTE
Patient: Abbey Aguiar    Procedure: Procedure(s):  COLONOSCOPY, WITH POLYPECTOMY AND BIOPSY       Diagnosis: Colon cancer screening [Z12.11]  Diagnosis Additional Information: No value filed.    Anesthesia Type:   MAC     Note:    Oropharynx: oropharynx clear of all foreign objects  Level of Consciousness: drowsy  Oxygen Supplementation: room air    Independent Airway: airway patency satisfactory and stable  Dentition: dentition unchanged  Vital Signs Stable: post-procedure vital signs reviewed and stable  Report to RN Given: handoff report given  Patient transferred to: Phase II  Comments: After procedure in [facility]  Special [facility] [place] under monitored anesthesia care (MAC), patient exhibited spontaneous respirations, patient breathing room air with oxygen saturation maintained greater than 95%, patient brought to [facility] recovery bay for postprocedure recovery, SpO2, NiBP, and EKG monitors and alarms on and functioning, report on patient's clinical status given to recovery-trained endoscopy RN, RN questions answered'.    Handoff Report: Identifed the Patient, Identified the Reponsible Provider, Reviewed the pertinent medical history, Discussed the surgical course, Reviewed Intra-OP anesthesia mangement and issues during anesthesia, Set expectations for post-procedure period and Allowed opportunity for questions and acknowledgement of understanding      Vitals:  Vitals Value Taken Time   /64 07/19/22 0917   Temp     Pulse 79 07/19/22 0918   Resp 36 07/19/22 0919   SpO2 95 % 07/19/22 0919   Vitals shown include unvalidated device data.    Electronically Signed By: Christine Marie Volp Hodgkins, CRNA, APRN CRNA  July 19, 2022  9:21 AM

## 2022-07-19 NOTE — ANESTHESIA PREPROCEDURE EVALUATION
Anesthesia Pre-Procedure Evaluation    Patient: Abbey Aguiar   MRN: [id number] : 1950        Procedure : Procedure(s):  COLONOSCOPY          Past Medical History:   Diagnosis Date     Anemia      Ankylosing spondylitis (H)      Arthritis     Ankylosing Spondylitis     Blood transfusion     No Reactions Noted     Coronary artery disease      Crohn's disease (H)      Fructose disorder     malabsorption     Hypertension      Kidney stones     history of multiple kidney stones     Osteoporosis       Past Surgical History:   Procedure Laterality Date     ANESTH, MYELOGRAPHY, DISCOGRAPHY, VERTEBROPLASTY      multiple fractures     BIOPSY  Mid     Right Breast Biopsy - Negative     CARDIAC SURGERY  3/24/11    Heart Stent Placed     COLONOSCOPY       COLONOSCOPY  13     COLONOSCOPY N/A 2020    Procedure: COLONOSCOPY, WITH POLYPECTOMY AND BIOPSY;  Surgeon: Yara Ulloa MD;  Location:  GI     GENERAL SURGERY                           DATE:  4-      left shoulder suregery at [place] then had reverse shoulder surgery  foot surgery for arthritis (), R hand surgery (), kidney stones ()      GENITOURINARY SURGERY      History of Kidney Stones/Removed     HC MUSC/TENDON REPAIR EACH; ARM/ELBOW  early      HYSTERECTOMY, JESSA  85    right ovary intact     ORTHOPEDIC SURGERY Right 2020    hip repair/surgery      Allergies   Allergen Reactions     Tagamet      agranulocytosis     Tetracycline Hives      Social History     Tobacco Use     Smoking status: Former Smoker     Packs/day: 0.50     Years: 5.00     Pack years: 2.50     Types: Cigarettes     Quit date: 1972     Years since quittin.5     Smokeless tobacco: Never Used   Substance Use Topics     Alcohol use: No      Wt Readings from Last 1 Encounters:   20 52 kg (114 lb 9.6 oz)        Anesthesia Evaluation            ROS/MED HX  ENT/Pulmonary:     (+) tobacco use, Past use,  (-) sleep apnea    Neurologic:       Cardiovascular:     (+) Dyslipidemia hypertension--CAD --stent-2011. Previous cardiac testing   Echo: Date: Results:  Final Impressions:    1. Normal LV size, mildly increased wall thickness with sigmoid septum, normal global systolic function with an estimated EF of 65 - 70%.    2. Right ventricular cavity size is normal, global systolic RV function is normal.    3. Moderately enlarged left atrium.    4. Grade 2 pattern of LV diastolic filling.    5. The mitral valve is degenerative, mild to moderate mitral regurgitation.    Stress Test: Date: Results:    ECG Reviewed: Date: Results:    Cath: Date: Results:      METS/Exercise Tolerance:     Hematologic:     (+) anemia,     Musculoskeletal: Comment: Ankylosing spondylitis  (+) arthritis,     GI/Hepatic: Comment: Crohn's disease      Renal/Genitourinary:     (+) renal disease, type: CRI,     Endo:       Psychiatric/Substance Use:       Infectious Disease:       Malignancy:       Other:            Physical Exam    Airway        Mallampati: II   TM distance: > 3 FB   Neck ROM: full   Mouth opening: > 3 cm    Respiratory Devices and Support         Dental  no notable dental history         Cardiovascular          Rhythm and rate: regular and normal     Pulmonary   pulmonary exam normal                OUTSIDE LABS:  CBC:   Lab Results   Component Value Date    WBC 7.8 02/20/2017    WBC 12.2 (H) 08/22/2016    HGB 11.5 (L) 02/20/2017    HGB 12.3 08/22/2016    HCT 34.0 (L) 02/20/2017    HCT 36.5 08/22/2016     02/20/2017     08/22/2016     BMP:   Lab Results   Component Value Date     02/20/2017     08/22/2016    POTASSIUM 5.1 02/20/2017    POTASSIUM 5.1 08/22/2016    CHLORIDE 108 02/20/2017    CHLORIDE 108 08/22/2016    CO2 21 02/20/2017    CO2 20 08/22/2016    BUN 59 (H) 02/20/2017    BUN 37 (H) 08/22/2016    CR 1.390 (H) 02/14/2018    CR 1.46 (H) 02/20/2017    GLC 91 02/20/2017    GLC 85 08/22/2016     COAGS: No results found  for: PTT, INR, FIBR  POC: No results found for: BGM, HCG, HCGS  HEPATIC:   Lab Results   Component Value Date    ALBUMIN 3.6 02/20/2017    PROTTOTAL 8.2 02/20/2017    ALT 69 (H) 02/14/2018    AST 56 (H) 02/14/2018    ALKPHOS 108 02/20/2017    BILITOTAL 0.4 02/20/2017     OTHER:   Lab Results   Component Value Date    ANABEL 9.6 02/20/2017    CRP 60.8 (H) 05/25/2010    SED 53 (H) 12/12/2014       Anesthesia Plan    ASA Status:  3   NPO Status:  NPO Appropriate    Anesthesia Type: MAC.     - Reason for MAC: chronic cardiopulmonary disease, straight local not clinically adequate              Consents    Anesthesia Plan(s) and associated risks, benefits, and realistic alternatives discussed. Questions answered and patient/representative(s) expressed understanding.    - Discussed:     - Discussed with:  Patient         Postoperative Care    Pain management: IV analgesics.        Comments:                Rusty Kohli MD

## 2022-07-20 LAB
PATH REPORT.COMMENTS IMP SPEC: NORMAL
PATH REPORT.FINAL DX SPEC: NORMAL
PATH REPORT.GROSS SPEC: NORMAL
PATH REPORT.MICROSCOPIC SPEC OTHER STN: NORMAL
PATH REPORT.RELEVANT HX SPEC: NORMAL
PHOTO IMAGE: NORMAL

## 2022-07-20 PROCEDURE — 88305 TISSUE EXAM BY PATHOLOGIST: CPT | Mod: 26 | Performed by: PATHOLOGY

## 2022-08-31 ENCOUNTER — HOSPITAL ENCOUNTER (OUTPATIENT)
Dept: MRI IMAGING | Facility: CLINIC | Age: 72
Discharge: HOME | End: 2022-08-31
Attending: PHYSICIAN ASSISTANT
Payer: COMMERCIAL

## 2022-08-31 ENCOUNTER — HOSPITAL ENCOUNTER (OUTPATIENT)
Facility: CLINIC | Age: 72
Discharge: HOME | End: 2022-08-31
Admitting: RADIOLOGY
Payer: COMMERCIAL

## 2022-08-31 DIAGNOSIS — I25.10 CORONARY ARTERY DISEASE, UNSPECIFIED VESSEL OR LESION TYPE, UNSPECIFIED WHETHER ANGINA PRESENT, UNSPECIFIED WHETHER NATIVE OR TRANSPLANTED HEART: ICD-10-CM

## 2022-08-31 DIAGNOSIS — N18.9 CHRONIC KIDNEY DISEASE, UNSPECIFIED CKD STAGE: ICD-10-CM

## 2022-08-31 DIAGNOSIS — K50.10 CROHN'S DISEASE OF COLON WITHOUT COMPLICATION (H): ICD-10-CM

## 2022-08-31 DIAGNOSIS — M45.9 ANKYLOSING SPONDYLITIS OF UNSPECIFIED SITES IN SPINE (H): ICD-10-CM

## 2022-08-31 PROCEDURE — 74183 MRI ABD W/O CNTR FLWD CNTR: CPT

## 2022-08-31 PROCEDURE — A9585 GADOBUTROL INJECTION: HCPCS | Performed by: PHYSICIAN ASSISTANT

## 2022-08-31 PROCEDURE — 255N000002 HC RX 255 OP 636: Performed by: PHYSICIAN ASSISTANT

## 2022-08-31 PROCEDURE — 999N000154 HC STATISTIC RADIOLOGY XRAY, US, CT, MAR, NM

## 2022-08-31 PROCEDURE — 250N000011 HC RX IP 250 OP 636: Performed by: PHYSICIAN ASSISTANT

## 2022-08-31 RX ORDER — GADOBUTROL 604.72 MG/ML
5 INJECTION INTRAVENOUS ONCE
Status: COMPLETED | OUTPATIENT
Start: 2022-08-31 | End: 2022-08-31

## 2022-08-31 RX ORDER — GADOBUTROL 604.72 MG/ML
7.5 INJECTION INTRAVENOUS ONCE
Status: DISCONTINUED | OUTPATIENT
Start: 2022-08-31 | End: 2022-08-31 | Stop reason: CLARIF

## 2022-08-31 RX ADMIN — GLUCAGON HYDROCHLORIDE 0.5 MG: KIT at 07:25

## 2022-08-31 RX ADMIN — GLUCAGON HYDROCHLORIDE 0.5 MG: KIT at 07:32

## 2022-08-31 RX ADMIN — GADOBUTROL 5 ML: 604.72 INJECTION INTRAVENOUS at 06:53

## 2022-08-31 ASSESSMENT — ACTIVITIES OF DAILY LIVING (ADL): ADLS_ACUITY_SCORE: 35

## 2022-08-31 NOTE — PROGRESS NOTES
Care Suites Discharge Summary MRI Enterography    Discharge Criteria:   Discharge Criteria met per MD orders: Yes.   Vital signs stable.     Pt demonstrates ability to ambulate safely: Yes.    Patient discharged to home.    Jay Javed RN

## 2022-10-22 ENCOUNTER — HEALTH MAINTENANCE LETTER (OUTPATIENT)
Age: 72
End: 2022-10-22

## 2023-04-01 ENCOUNTER — HEALTH MAINTENANCE LETTER (OUTPATIENT)
Age: 73
End: 2023-04-01

## 2024-06-02 ENCOUNTER — HEALTH MAINTENANCE LETTER (OUTPATIENT)
Age: 74
End: 2024-06-02

## 2025-06-15 ENCOUNTER — HEALTH MAINTENANCE LETTER (OUTPATIENT)
Age: 75
End: 2025-06-15